# Patient Record
Sex: MALE | Race: WHITE | NOT HISPANIC OR LATINO | Employment: FULL TIME | URBAN - METROPOLITAN AREA
[De-identification: names, ages, dates, MRNs, and addresses within clinical notes are randomized per-mention and may not be internally consistent; named-entity substitution may affect disease eponyms.]

---

## 2018-10-17 ENCOUNTER — APPOINTMENT (OUTPATIENT)
Dept: RADIOLOGY | Facility: CLINIC | Age: 23
End: 2018-10-17
Payer: OTHER MISCELLANEOUS

## 2018-10-17 ENCOUNTER — APPOINTMENT (OUTPATIENT)
Dept: URGENT CARE | Facility: CLINIC | Age: 23
End: 2018-10-17
Payer: OTHER MISCELLANEOUS

## 2018-10-17 DIAGNOSIS — M25.572 LEFT ANKLE PAIN, UNSPECIFIED CHRONICITY: Primary | ICD-10-CM

## 2018-10-17 DIAGNOSIS — M25.572 LEFT ANKLE PAIN, UNSPECIFIED CHRONICITY: ICD-10-CM

## 2018-10-17 PROCEDURE — G0382 LEV 3 HOSP TYPE B ED VISIT: HCPCS | Performed by: PHYSICIAN ASSISTANT

## 2018-10-17 PROCEDURE — 73610 X-RAY EXAM OF ANKLE: CPT

## 2018-10-17 PROCEDURE — 99283 EMERGENCY DEPT VISIT LOW MDM: CPT | Performed by: PHYSICIAN ASSISTANT

## 2018-10-22 ENCOUNTER — APPOINTMENT (OUTPATIENT)
Dept: URGENT CARE | Facility: CLINIC | Age: 23
End: 2018-10-22
Payer: OTHER MISCELLANEOUS

## 2018-10-22 PROCEDURE — 99213 OFFICE O/P EST LOW 20 MIN: CPT

## 2018-10-31 ENCOUNTER — APPOINTMENT (OUTPATIENT)
Dept: URGENT CARE | Facility: CLINIC | Age: 23
End: 2018-10-31
Payer: OTHER MISCELLANEOUS

## 2018-10-31 PROCEDURE — 99213 OFFICE O/P EST LOW 20 MIN: CPT | Performed by: NURSE PRACTITIONER

## 2019-12-17 NOTE — PROGRESS NOTES
CC:   Chief Complaint   Patient presents with   • Establish Care     c/o high BP, w/ migraines, x1 month. Wants flu shot.     Subjective   Jorje Lockwood is a 24 y.o. male presenting to establish care And discuss blood pressure/headaches.    He states that at the end of October he was having a bad migraine.  His roommate who has high blood pressure told him to check with their home BP device.  When he checked, his blood pressure was around 180/102.  He checked multiple times over the following weekAnd it continued to be elevated, though did trend down slightly. His headache started improving and he stopped checking his blood pressure. The migraine itself lasted about a week.  It improved with Excedrin.    He states that at that time his blood pressure was high, he also felt chest heaviness Though not flat out pain and some shortness of breath.  All of these symptoms have resolved since October    No known family history of elevated blood pressure    Drinks alcohol sparingly  Smokes cigar intermittent  Drinks coffee sparingly   Rarely takes NSAIDs    Migraine Headaches for years   Description of pain: global, bilateral in the frontal area.   Duration of individual headaches: 1 day to 1 week , frequency monthly.   Associated symptoms: nausea and stiff neck.  Pain relief/Meds Used: acetaminophen, lying in a darkened room and sleep.   Precipitating factors: patient is aware of none.   He denies a history of recent head injury.   Prior neurological history: negative for migraine headaches.  Neurologic Review of Systems - no TIA or stroke-like symptoms, no amaurosis, diplopia, abnormal speech, unilateral numbness or weakness.    Patient History   Past Medical History:   Diagnosis Date   • Migraines        History reviewed. No pertinent surgical history.  History reviewed. No pertinent family history.  Allergies   Allergen Reactions   • Hydrocodone      Jaw clenches, acting hyper     Social History     Socioeconomic  History   • Marital status: Single     Spouse name: None   • Number of children: None   • Years of education: None   • Highest education level: None   Occupational History   • None   Social Needs   • Financial resource strain: None   • Food insecurity:     Worry: None     Inability: None   • Transportation needs:     Medical: None     Non-medical: None   Tobacco Use   • Smoking status: Former Smoker   • Smokeless tobacco: Never Used   Substance and Sexual Activity   • Alcohol use: Yes     Comment: infrequently    • Drug use: Never   • Sexual activity: Defer   Lifestyle   • Physical activity:     Days per week: None     Minutes per session: None   • Stress: None   Relationships   • Social connections:     Talks on phone: None     Gets together: None     Attends Shinto service: None     Active member of club or organization: None     Attends meetings of clubs or organizations: None     Relationship status: None   • Intimate partner violence:     Fear of current or ex partner: None     Emotionally abused: None     Physically abused: None     Forced sexual activity: None   Other Topics Concern   • None   Social History Narrative   • None     No current outpatient medications on file.       Review of Systems   Review of Systems   Constitutional: Negative.    HENT: Negative.    Eyes: Negative.    Respiratory: Positive for chest tightness and shortness of breath.    Cardiovascular: Positive for chest pain. Negative for palpitations and leg swelling.   Gastrointestinal: Negative.    Endocrine: Negative.    Genitourinary: Negative.    Musculoskeletal: Negative.    Skin: Negative.    Neurological: Positive for headaches.   Hematological: Negative.    Psychiatric/Behavioral: Negative.          Objective   Physical Exam:  Vitals:    12/18/19 0759   BP: 132/90   BP Location: Left upper arm   Patient Position: Sitting   Pulse: 75   Resp: 16   Temp: 36.6 °C (97.9 °F)   TempSrc: Oral   SpO2: 97%   Weight: 87.1 kg (192 lb)  "  Height: 1.778 m (5' 10\")     Physical Exam   Constitutional: He is oriented to person, place, and time. He appears well-developed and well-nourished.   HENT:   Head: Normocephalic and atraumatic.   Eyes: Conjunctivae and EOM are normal.   Neck: Normal range of motion. Neck supple.   Cardiovascular: Normal rate, regular rhythm and normal pulses. Exam reveals no gallop.   No murmur heard.  Pulmonary/Chest: Effort normal and breath sounds normal.   Abdominal: Soft. Bowel sounds are normal.   Musculoskeletal: Normal range of motion. He exhibits no edema.   Neurological: He is alert and oriented to person, place, and time. He has normal strength and normal reflexes. No cranial nerve deficit or sensory deficit.   Skin: Skin is warm and dry.       EKG is normal sinus rhythm at 61 bpm, No signs of heart strain, no ST changes, does not meet criteria for LVH    Assessment/Plan   Jorje Lockwood is a 24 y.o. male presenting for   Chief Complaint   Patient presents with   • Establish Care     c/o high BP, w/ migraines, x1 month. Wants flu shot.      Diagnosis Plan   1. Elevated BP without diagnosis of hypertension  CBC and differential    Comprehensive metabolic panel    TSH w reflex FT4    ECG 12 LEAD OFFICE PERFORMED    CBC and differential    Comprehensive metabolic panel    TSH w reflex FT4   2. Migraine without aura and without status migrainosus, not intractable       Patient with elevated blood pressures back in October.  On repeat here today, his diastolic is elevated to about 88-90. I will have the patient check his blood pressures over the next 4 weeks, at least 10 data points. If his blood pressures do still remain elevated, given he has no family history and is overall healthy, I would recommend evaluation of secondary causes of hypertension.  We will get some blood work today including TSH, CBC, and CMP.  Consider aldosterone/renin if blood pressures running high. Given its his diastolic number that is elevated, I " would consider starting Him on amlodipine on follow-up.      For his migraines, they are fairly infrequent and improved with Excedrin. As we evaluate his blood Pressure, we will see if it is related to his headaches. I recommend a headache diary over the next month.  If his headaches are coming frequently enough To warrant Maintenance medication, but we may consider a triptan in the future. Also unclear if actually migraine headaches versus tension type headaches, headache diary will be useful for this.      Return in about 4 weeks (around 1/15/2020).

## 2019-12-18 ENCOUNTER — OFFICE VISIT (OUTPATIENT)
Dept: FAMILY MEDICINE | Facility: CLINIC | Age: 24
End: 2019-12-18
Payer: COMMERCIAL

## 2019-12-18 VITALS
WEIGHT: 192 LBS | HEIGHT: 70 IN | RESPIRATION RATE: 16 BRPM | TEMPERATURE: 97.9 F | OXYGEN SATURATION: 97 % | HEART RATE: 75 BPM | DIASTOLIC BLOOD PRESSURE: 90 MMHG | SYSTOLIC BLOOD PRESSURE: 132 MMHG | BODY MASS INDEX: 27.49 KG/M2

## 2019-12-18 DIAGNOSIS — G43.009 MIGRAINE WITHOUT AURA AND WITHOUT STATUS MIGRAINOSUS, NOT INTRACTABLE: ICD-10-CM

## 2019-12-18 DIAGNOSIS — R03.0 ELEVATED BP WITHOUT DIAGNOSIS OF HYPERTENSION: Primary | ICD-10-CM

## 2019-12-18 PROBLEM — R51.9 ACUTE NONINTRACTABLE HEADACHE: Status: ACTIVE | Noted: 2019-12-18

## 2019-12-18 PROCEDURE — 99204 OFFICE O/P NEW MOD 45 MIN: CPT | Mod: 25 | Performed by: FAMILY MEDICINE

## 2019-12-18 PROCEDURE — 90471 IMMUNIZATION ADMIN: CPT | Performed by: FAMILY MEDICINE

## 2019-12-18 PROCEDURE — 90686 IIV4 VACC NO PRSV 0.5 ML IM: CPT | Performed by: FAMILY MEDICINE

## 2019-12-18 PROCEDURE — 93000 ELECTROCARDIOGRAM COMPLETE: CPT | Performed by: FAMILY MEDICINE

## 2019-12-18 ASSESSMENT — ENCOUNTER SYMPTOMS
CHEST TIGHTNESS: 1
ENDOCRINE NEGATIVE: 1
PSYCHIATRIC NEGATIVE: 1
CONSTITUTIONAL NEGATIVE: 1
GASTROINTESTINAL NEGATIVE: 1
HEMATOLOGIC/LYMPHATIC NEGATIVE: 1
HEADACHES: 1
SHORTNESS OF BREATH: 1
PALPITATIONS: 0
MUSCULOSKELETAL NEGATIVE: 1
EYES NEGATIVE: 1

## 2019-12-18 ASSESSMENT — PAIN SCALES - GENERAL: PAINLEVEL: 0-NO PAIN

## 2019-12-19 PROBLEM — R74.01 TRANSAMINITIS: Status: ACTIVE | Noted: 2019-12-19

## 2019-12-19 LAB
ALBUMIN SERPL-MCNC: 4.5 G/DL (ref 3.6–5.1)
ALBUMIN/GLOB SERPL: 1.9 (CALC) (ref 1–2.5)
ALP SERPL-CCNC: 91 U/L (ref 40–115)
ALT SERPL-CCNC: 110 U/L (ref 9–46)
AST SERPL-CCNC: 42 U/L (ref 10–40)
BASOPHILS # BLD AUTO: 27 CELLS/UL (ref 0–200)
BASOPHILS NFR BLD AUTO: 0.4 %
BILIRUB SERPL-MCNC: 0.7 MG/DL (ref 0.2–1.2)
BUN SERPL-MCNC: 13 MG/DL (ref 7–25)
BUN/CREAT SERPL: ABNORMAL (CALC) (ref 6–22)
CALCIUM SERPL-MCNC: 9.6 MG/DL (ref 8.6–10.3)
CHLORIDE SERPL-SCNC: 105 MMOL/L (ref 98–110)
CO2 SERPL-SCNC: 29 MMOL/L (ref 20–32)
CREAT SERPL-MCNC: 1.06 MG/DL (ref 0.6–1.35)
EOSINOPHIL # BLD AUTO: 121 CELLS/UL (ref 15–500)
EOSINOPHIL NFR BLD AUTO: 1.8 %
ERYTHROCYTE [DISTWIDTH] IN BLOOD BY AUTOMATED COUNT: 12.5 % (ref 11–15)
GLOBULIN SER CALC-MCNC: 2.4 G/DL (CALC) (ref 1.9–3.7)
GLUCOSE SERPL-MCNC: 94 MG/DL (ref 65–99)
HCT VFR BLD AUTO: 46 % (ref 38.5–50)
HGB BLD-MCNC: 15.7 G/DL (ref 13.2–17.1)
LYMPHOCYTES # BLD AUTO: 1916 CELLS/UL (ref 850–3900)
LYMPHOCYTES NFR BLD AUTO: 28.6 %
MCH RBC QN AUTO: 29.3 PG (ref 27–33)
MCHC RBC AUTO-ENTMCNC: 34.1 G/DL (ref 32–36)
MCV RBC AUTO: 85.8 FL (ref 80–100)
MONOCYTES # BLD AUTO: 630 CELLS/UL (ref 200–950)
MONOCYTES NFR BLD AUTO: 9.4 %
NEUTROPHILS # BLD AUTO: 4007 CELLS/UL (ref 1500–7800)
NEUTROPHILS NFR BLD AUTO: 59.8 %
PLATELET # BLD AUTO: 262 THOUSAND/UL (ref 140–400)
PMV BLD REES-ECKER: 12.8 FL (ref 7.5–12.5)
POTASSIUM SERPL-SCNC: 4.6 MMOL/L (ref 3.5–5.3)
PROT SERPL-MCNC: 6.9 G/DL (ref 6.1–8.1)
QUEST EGFR NON-AFR. AMERICAN: 96 ML/MIN/1.73M2
RBC # BLD AUTO: 5.36 MILLION/UL (ref 4.2–5.8)
SODIUM SERPL-SCNC: 141 MMOL/L (ref 135–146)
TSH SERPL-ACNC: 2.49 MIU/L (ref 0.4–4.5)
WBC # BLD AUTO: 6.7 THOUSAND/UL (ref 3.8–10.8)

## 2020-01-17 ENCOUNTER — OFFICE VISIT (OUTPATIENT)
Dept: FAMILY MEDICINE | Facility: CLINIC | Age: 25
End: 2020-01-17
Payer: COMMERCIAL

## 2020-01-17 VITALS
HEART RATE: 82 BPM | BODY MASS INDEX: 27.06 KG/M2 | HEIGHT: 70 IN | TEMPERATURE: 97.8 F | WEIGHT: 189 LBS | OXYGEN SATURATION: 98 % | SYSTOLIC BLOOD PRESSURE: 130 MMHG | DIASTOLIC BLOOD PRESSURE: 90 MMHG | RESPIRATION RATE: 16 BRPM

## 2020-01-17 DIAGNOSIS — R74.01 TRANSAMINITIS: ICD-10-CM

## 2020-01-17 DIAGNOSIS — Z00.00 ENCOUNTER FOR GENERAL ADULT MEDICAL EXAMINATION WITHOUT ABNORMAL FINDINGS: Primary | ICD-10-CM

## 2020-01-17 DIAGNOSIS — I10 ESSENTIAL HYPERTENSION: ICD-10-CM

## 2020-01-17 DIAGNOSIS — G43.009 MIGRAINE WITHOUT AURA AND WITHOUT STATUS MIGRAINOSUS, NOT INTRACTABLE: ICD-10-CM

## 2020-01-17 PROCEDURE — 99395 PREV VISIT EST AGE 18-39: CPT | Performed by: FAMILY MEDICINE

## 2020-01-17 RX ORDER — AMLODIPINE BESYLATE 5 MG/1
5 TABLET ORAL DAILY
Qty: 90 TABLET | Refills: 0 | Status: SHIPPED | OUTPATIENT
Start: 2020-01-17 | End: 2020-04-16

## 2020-01-17 ASSESSMENT — ENCOUNTER SYMPTOMS
RESPIRATORY NEGATIVE: 1
HEADACHES: 1
ENDOCRINE NEGATIVE: 1
GASTROINTESTINAL NEGATIVE: 1
CARDIOVASCULAR NEGATIVE: 1
CONSTITUTIONAL NEGATIVE: 1
PSYCHIATRIC NEGATIVE: 1
HEMATOLOGIC/LYMPHATIC NEGATIVE: 1
MUSCULOSKELETAL NEGATIVE: 1
EYES NEGATIVE: 1

## 2020-01-17 ASSESSMENT — PAIN SCALES - GENERAL: PAINLEVEL: 0-NO PAIN

## 2020-01-17 NOTE — PROGRESS NOTES
CC:   Chief Complaint   Patient presents with   • Annual Exam     Subjective   Jorje Lockwood is a 24 y.o. male presenting for physical  ?  No acute complaints    #HTN: BPs have been in the 130-140s/90s at home    #Migraine Headaches : Only 1 in the last month, He is realizing more more that he thinks it was related to stress from school    Diet: Too salty, too much fast food  Exercise: intermittent   Dental: needs to see dentist  Eye exam: yearly    Work: Student at [employer], computer tech  Relationship: Single, sexually active, women     Patient History   Past Medical History:   Diagnosis Date   • Migraines        History reviewed. No pertinent surgical history.  History reviewed. No pertinent family history.  Allergies   Allergen Reactions   • Hydrocodone      Jaw clenches, acting hyper     Social History     Socioeconomic History   • Marital status: Single     Spouse name: None   • Number of children: None   • Years of education: None   • Highest education level: None   Occupational History   • None   Social Needs   • Financial resource strain: None   • Food insecurity:     Worry: None     Inability: None   • Transportation needs:     Medical: None     Non-medical: None   Tobacco Use   • Smoking status: Former Smoker   • Smokeless tobacco: Never Used   Substance and Sexual Activity   • Alcohol use: Yes     Comment: infrequently    • Drug use: Never   • Sexual activity: Defer   Lifestyle   • Physical activity:     Days per week: None     Minutes per session: None   • Stress: None   Relationships   • Social connections:     Talks on phone: None     Gets together: None     Attends Zoroastrianism service: None     Active member of club or organization: None     Attends meetings of clubs or organizations: None     Relationship status: None   • Intimate partner violence:     Fear of current or ex partner: None     Emotionally abused: None     Physically abused: None     Forced sexual activity: None   Other  "Topics Concern   • None   Social History Narrative   • None       Current Outpatient Medications:   •  amLODIPine (NORVASC) 5 mg tablet, Take 1 tablet (5 mg total) by mouth daily., Disp: 90 tablet, Rfl: 0       Review of Systems   Review of Systems   Constitutional: Negative.    HENT: Negative.    Eyes: Negative.    Respiratory: Negative.    Cardiovascular: Negative.    Gastrointestinal: Negative.    Endocrine: Negative.    Genitourinary: Negative.    Musculoskeletal: Negative.    Skin: Negative.    Neurological: Positive for headaches.   Hematological: Negative.    Psychiatric/Behavioral: Negative.        Objective   Physical Exam:  Vitals:    01/17/20 0851   BP: 130/90   BP Location: Left upper arm   Patient Position: Sitting   Pulse: 82   Resp: 16   Temp: 36.6 °C (97.8 °F)   TempSrc: Oral   SpO2: 98%   Weight: 85.7 kg (189 lb)   Height: 1.778 m (5' 10\")     Physical Exam   Constitutional: He is oriented to person, place, and time. He appears well-developed and well-nourished.   HENT:   Head: Normocephalic and atraumatic.   Eyes: Conjunctivae and EOM are normal.   Neck: Normal range of motion. Neck supple.   Cardiovascular: Normal rate, regular rhythm and normal heart sounds.   Pulmonary/Chest: Effort normal and breath sounds normal.   Abdominal: Soft. Bowel sounds are normal.   Musculoskeletal: Normal range of motion. He exhibits no edema.   Neurological: He is alert and oriented to person, place, and time.   Skin: Skin is warm and dry.   ?  ?  Assessment/Plan   Jorje Lockwood is a 24 y.o. male presenting for   Chief Complaint   Patient presents with   • Annual Exam      Diagnosis Plan   1. Encounter for general adult medical examination without abnormal findings     2. Essential hypertension  amLODIPine (NORVASC) 5 mg tablet    Given his blood pressures are consistently over diastolic 90, will start on amlodipine 5 mg daily.  Patient to check blood pressures.  Return in 4-8 weeks   3. Migraine without aura and " without status migrainosus, not intractable      Have improved in frequency, patient hydrating, continue episodic treatment   4. Transaminitis  Comprehensive metabolic panel    Comprehensive metabolic panel    AST and ALT elevated on last check, ALT more than AST.  Recheck CMP in 2 weeks-- Consider hepatitis panel and right upper quadrant ultrasound if high     #Health Maintenance: Up-to-date  - Immunizations: Patient thinks that he got his Tdap at 16, working on outpatient record    Return in about 2 months (around 3/17/2020).

## 2020-01-17 NOTE — PATIENT INSTRUCTIONS
Patient Education     Blood Pressure Record Sheet  To take your blood pressure, you will need a blood pressure machine. You can buy a blood pressure machine (blood pressure monitor) at your clinic, drug store, or online. When choosing one, consider:  · An automatic monitor that has an arm cuff.  · A cuff that wraps snugly around your upper arm. You should be able to fit only one finger between your arm and the cuff.  · A device that stores blood pressure reading results.  · Do not choose a monitor that measures your blood pressure from your wrist or finger.  Follow your health care provider's instructions for how to take your blood pressure. To use this form:  · Get one reading in the morning (a.m.) before you take any medicines.  · Get one reading in the evening (p.m.) before supper.  · Take at least 2 readings with each blood pressure check. This makes sure the results are correct. Wait 1-2 minutes between measurements.  · Write down the results in the spaces on this form.  · Repeat this once a week, or as told by your health care provider.  · Make a follow-up appointment with your health care provider to discuss the results.  Blood pressure log  Date: _______________________  · a.m. _____________________(1st reading) _____________________(2nd reading)  · p.m. _____________________(1st reading) _____________________(2nd reading)  Date: _______________________  · a.m. _____________________(1st reading) _____________________(2nd reading)  · p.m. _____________________(1st reading) _____________________(2nd reading)  Date: _______________________  · a.m. _____________________(1st reading) _____________________(2nd reading)  · p.m. _____________________(1st reading) _____________________(2nd reading)  Date: _______________________  · a.m. _____________________(1st reading) _____________________(2nd reading)  · p.m. _____________________(1st reading) _____________________(2nd reading)  Date:  "_______________________  · a.m. _____________________(1st reading) _____________________(2nd reading)  · p.m. _____________________(1st reading) _____________________(2nd reading)  This information is not intended to replace advice given to you by your health care provider. Make sure you discuss any questions you have with your health care provider.  Document Released: 09/15/2004 Document Revised: 12/18/2018 Document Reviewed: 12/18/2018  [employer] Interactive Patient Education © 2019 [employer]     Patient Education     DASH Eating Plan  DASH stands for \"Dietary Approaches to Stop Hypertension.\" The DASH eating plan is a healthy eating plan that has been shown to reduce high blood pressure (hypertension). It may also reduce your risk for type 2 diabetes, heart disease, and stroke. The DASH eating plan may also help with weight loss.  What are tips for following this plan?    General guidelines  · Avoid eating more than 2,300 mg (milligrams) of salt (sodium) a day. If you have hypertension, you may need to reduce your sodium intake to 1,500 mg a day.  · Limit alcohol intake to no more than 1 drink a day for nonpregnant women and 2 drinks a day for men. One drink equals 12 oz of beer, 5 oz of wine, or 1½ oz of hard liquor.  · Work with your health care provider to maintain a healthy body weight or to lose weight. Ask what an ideal weight is for you.  · Get at least 30 minutes of exercise that causes your heart to beat faster (aerobic exercise) most days of the week. Activities may include walking, swimming, or biking.  · Work with your health care provider or diet and nutrition specialist (dietitian) to adjust your eating plan to your individual calorie needs.  Reading food labels    · Check food labels for the amount of sodium per serving. Choose foods with less than 5 percent of the Daily Value of sodium. Generally, foods with less than 300 mg of sodium per serving fit into this eating plan.  · To find whole " "grains, look for the word \"whole\" as the first word in the ingredient list.  Shopping  · Buy products labeled as \"low-sodium\" or \"no salt added.\"  · Buy fresh foods. Avoid canned foods and premade or frozen meals.  Cooking  · Avoid adding salt when cooking. Use salt-free seasonings or herbs instead of table salt or sea salt. Check with your health care provider or pharmacist before using salt substitutes.  · Do not low foods. Cook foods using healthy methods such as baking, boiling, grilling, and broiling instead.  · Cook with heart-healthy oils, such as olive, canola, soybean, or sunflower oil.  Meal planning  · Eat a balanced diet that includes:  ? 5 or more servings of fruits and vegetables each day. At each meal, try to fill half of your plate with fruits and vegetables.  ? Up to 6-8 servings of whole grains each day.  ? Less than 6 oz of lean meat, poultry, or fish each day. A 3-oz serving of meat is about the same size as a deck of cards. One egg equals 1 oz.  ? 2 servings of low-fat dairy each day.  ? A serving of nuts, seeds, or beans 5 times each week.  ? Heart-healthy fats. Healthy fats called Omega-3 fatty acids are found in foods such as flaxseeds and coldwater fish, like sardines, salmon, and mackerel.  · Limit how much you eat of the following:  ? Canned or prepackaged foods.  ? Food that is high in trans fat, such as fried foods.  ? Food that is high in saturated fat, such as fatty meat.  ? Sweets, desserts, sugary drinks, and other foods with added sugar.  ? Full-fat dairy products.  · Do not salt foods before eating.  · Try to eat at least 2 vegetarian meals each week.  · Eat more home-cooked food and less restaurant, buffet, and fast food.  · When eating at a restaurant, ask that your food be prepared with less salt or no salt, if possible.  What foods are recommended?  The items listed may not be a complete list. Talk with your dietitian about what dietary choices are best for " you.  Grains  Whole-grain or whole-wheat bread. Whole-grain or whole-wheat pasta. Brown rice. Oatmeal. Quinoa. Bulgur. Whole-grain and low-sodium cereals. Janki bread. Low-fat, low-sodium crackers. Whole-wheat flour tortillas.  Vegetables  Fresh or frozen vegetables (raw, steamed, roasted, or grilled). Low-sodium or reduced-sodium tomato and vegetable juice. Low-sodium or reduced-sodium tomato sauce and tomato paste. Low-sodium or reduced-sodium canned vegetables.  Fruits  All fresh, dried, or frozen fruit. Canned fruit in natural juice (without added sugar).  Meat and other protein foods  Skinless chicken or turkey. Ground chicken or turkey. Pork with fat trimmed off. Fish and seafood. Egg whites. Dried beans, peas, or lentils. Unsalted nuts, nut butters, and seeds. Unsalted canned beans. Lean cuts of beef with fat trimmed off. Low-sodium, lean deli meat.  Dairy  Low-fat (1%) or fat-free (skim) milk. Fat-free, low-fat, or reduced-fat cheeses. Nonfat, low-sodium ricotta or cottage cheese. Low-fat or nonfat yogurt. Low-fat, low-sodium cheese.  Fats and oils  Soft margarine without trans fats. Vegetable oil. Low-fat, reduced-fat, or light mayonnaise and salad dressings (reduced-sodium). Canola, safflower, olive, soybean, and sunflower oils. Avocado.  Seasoning and other foods  Herbs. Spices. Seasoning mixes without salt. Unsalted popcorn and pretzels. Fat-free sweets.  What foods are not recommended?  The items listed may not be a complete list. Talk with your dietitian about what dietary choices are best for you.  Grains  Baked goods made with fat, such as croissants, muffins, or some breads. Dry pasta or rice meal packs.  Vegetables  Creamed or fried vegetables. Vegetables in a cheese sauce. Regular canned vegetables (not low-sodium or reduced-sodium). Regular canned tomato sauce and paste (not low-sodium or reduced-sodium). Regular tomato and vegetable juice (not low-sodium or reduced-sodium). Zeinab.  Olives.  Fruits  Canned fruit in a light or heavy syrup. Fried fruit. Fruit in cream or butter sauce.  Meat and other protein foods  Fatty cuts of meat. Ribs. Fried meat. Beatty. Sausage. Bologna and other processed lunch meats. Salami. Fatback. Hotdogs. Bratwurst. Salted nuts and seeds. Canned beans with added salt. Canned or smoked fish. Whole eggs or egg yolks. Chicken or turkey with skin.  Dairy  Whole or 2% milk, cream, and half-and-half. Whole or full-fat cream cheese. Whole-fat or sweetened yogurt. Full-fat cheese. Nondairy creamers. Whipped toppings. Processed cheese and cheese spreads.  Fats and oils  Butter. Stick margarine. Lard. Shortening. Ghee. Beatty fat. Tropical oils, such as coconut, palm kernel, or palm oil.  Seasoning and other foods  Salted popcorn and pretzels. Onion salt, garlic salt, seasoned salt, table salt, and sea salt. Worcestershire sauce. Tartar sauce. Barbecue sauce. Teriyaki sauce. Soy sauce, including reduced-sodium. Steak sauce. Canned and packaged gravies. Fish sauce. Oyster sauce. Cocktail sauce. Horseradish that you find on the shelf. Ketchup. Mustard. Meat flavorings and tenderizers. Bouillon cubes. Hot sauce and Tabasco sauce. Premade or packaged marinades. Premade or packaged taco seasonings. Relishes. Regular salad dressings.  Where to find more information:  · National Heart, Lung, and Blood [place]: www.nhlbi.nih.gov  · American Heart Association: www.heart.org  Summary  · The DASH eating plan is a healthy eating plan that has been shown to reduce high blood pressure (hypertension). It may also reduce your risk for type 2 diabetes, heart disease, and stroke.  · With the DASH eating plan, you should limit salt (sodium) intake to 2,300 mg a day. If you have hypertension, you may need to reduce your sodium intake to 1,500 mg a day.  · When on the DASH eating plan, aim to eat more fresh fruits and vegetables, whole grains, lean proteins, low-fat dairy, and heart-healthy  fats.  · Work with your health care provider or diet and nutrition specialist (dietitian) to adjust your eating plan to your individual calorie needs.  This information is not intended to replace advice given to you by your health care provider. Make sure you discuss any questions you have with your health care provider.  Document Released: 12/06/2012 Document Revised: 12/11/2017 Document Reviewed: 12/11/2017  [employer] Interactive Patient Education © 2019 [employer]

## 2020-03-17 ENCOUNTER — TELEPHONE (OUTPATIENT)
Dept: FAMILY MEDICINE | Facility: CLINIC | Age: 25
End: 2020-03-17

## 2020-03-17 NOTE — TELEPHONE ENCOUNTER
SW patient, ok to cancel appt Friday. Does not want Telemedicine phone call. I will call in about a month to r/s for BP check. States he feels BP is under control at this time, w/ no side effects/symptoms.

## 2020-04-01 ENCOUNTER — TELEMEDICINE (OUTPATIENT)
Dept: FAMILY MEDICINE | Facility: CLINIC | Age: 25
End: 2020-04-01
Payer: COMMERCIAL

## 2020-04-01 ENCOUNTER — CLINICAL SUPPORT (OUTPATIENT)
Dept: OTHER | Facility: CLINIC | Age: 25
End: 2020-04-01
Attending: FAMILY MEDICINE

## 2020-04-01 ENCOUNTER — TELEPHONE (OUTPATIENT)
Dept: FAMILY MEDICINE | Facility: CLINIC | Age: 25
End: 2020-04-01

## 2020-04-01 DIAGNOSIS — R05.9 COUGH: ICD-10-CM

## 2020-04-01 DIAGNOSIS — R06.02 SHORTNESS OF BREATH: ICD-10-CM

## 2020-04-01 DIAGNOSIS — R06.02 SHORTNESS OF BREATH: Primary | ICD-10-CM

## 2020-04-01 PROCEDURE — 99213 OFFICE O/P EST LOW 20 MIN: CPT | Mod: 95 | Performed by: FAMILY MEDICINE

## 2020-04-01 RX ORDER — ALBUTEROL SULFATE 90 UG/1
2 INHALANT RESPIRATORY (INHALATION) EVERY 6 HOURS PRN
Qty: 1 INHALER | Refills: 1 | Status: SHIPPED | OUTPATIENT
Start: 2020-04-01 | End: 2021-01-20

## 2020-04-01 ASSESSMENT — ENCOUNTER SYMPTOMS
APPETITE CHANGE: 1
ENDOCRINE NEGATIVE: 1
EYES NEGATIVE: 1
SINUS PAIN: 0
SINUS PRESSURE: 0
RHINORRHEA: 0
GASTROINTESTINAL NEGATIVE: 1
FATIGUE: 1
NEUROLOGICAL NEGATIVE: 1
SHORTNESS OF BREATH: 1
CARDIOVASCULAR NEGATIVE: 1
HEMATOLOGIC/LYMPHATIC NEGATIVE: 1
PSYCHIATRIC NEGATIVE: 1
CHEST TIGHTNESS: 1
COUGH: 1
MUSCULOSKELETAL NEGATIVE: 1
FEVER: 0

## 2020-04-01 NOTE — TELEPHONE ENCOUNTER
Patient called with concern for covid19: cough, SOB (cannot take deep breath without coughing), lack of appetite, exhaustion, no fevers. 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 Has not been isolating himself, still working. Please triage at 2pm telemed. Thanks

## 2020-04-01 NOTE — PROGRESS NOTES
Request for Consent:   Audio Only Encounter   You and I are about to have a telemedicine check-in or visit. This is allowed because you have requested it. This telemedicine visit will be billed to your health insurance or you, if you are self-insured. You understand you will be responsible for any copayments or coinsurances that apply to your telemedicine visit. Before starting our telemedicine visit, I am required to get your consent for this virtual check-in or visit by telemedicine. Do you consent?    Patient Response to Request for Consent:  Yes    Timmy Lockwood is a 25 y.o. 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  Due to recent exposure and symptoms patient a possible COVID-19 Infection.  Signs and symptoms discussed with patient.  Patient educated to self isolate in a room in his home away from others they live with. Mask if available. Patient advised not to leave house for any reason.     Self treatment discussed including tylenol for fever, pain or myalgia; cough cold medications for symptoms.  Patient to check temperature daily.  Monitor for symptoms of respiratory distress.      Nature of disease to cause severe respiratory distress day 8 or 9 discussed.  If needs emergent care to notify EMS or ED or our office that he may have COVID to allow for proper PPE and isolation.     The following medications were prescribed:  Albuterol for respiratory wheezing, SOB, chest tightness      Review of Systems   Constitutional: Positive for appetite change and fatigue. Negative for fever.   HENT: Negative for congestion,  postnasal drip, rhinorrhea, sinus pressure and sinus pain.    Eyes: Negative.    Respiratory: Positive for cough, chest tightness and shortness of breath.    Cardiovascular: Negative.    Gastrointestinal: Negative.    Endocrine: Negative.    Genitourinary: Negative.    Musculoskeletal: Negative.    Skin: Negative.    Neurological: Negative.    Hematological: Negative.    Psychiatric/Behavioral: Negative.          The following have been reviewed and updated as appropriate in this visit:  Tobacco  Allergies  Meds  Problems  Med Hx  Surg Hx  Fam Hx  Soc Hx        ?  Objective   No acute distress over the phone, able to state complete sentences      Assessment/Plan   Jorje Lockwood is a 25 y.o. male being called via telemedicine.   Diagnosis Plan   1. Shortness of breath  albuterol HFA (VENTOLIN HFA) 90 mcg/actuation inhaler    COVID-19 ([facility])   2. Cough  albuterol HFA (VENTOLIN HFA) 90 mcg/actuation inhaler    COVID-19 ([facility])         Time Spent in Medical Discussion During This Encounter:  15 minutes

## 2020-04-01 NOTE — PROGRESS NOTES
Patient was processed today at [facility] drive-up clinic.  Pt denies any sort of medical distress today.  After identifying the patient, a nasopharyngeal sample was obtained and placed in viral transport medium.  Pt was given a post-collection education sheet and encouraged to self-isolate until they receive the results.  Pt directed to [facility] website for [facility] Privacy Practices.  Sample sent to the lab noted on the order and requisition.  Pt was without additional questions or concerns and was dispositioned from the testing area to home.

## 2020-04-06 LAB
QUEST OVERALL RESULT:: NOT DETECTED
SARS-COV RNA SPEC QL NAA+PROBE: NEGATIVE
SARS-COV-2 RNA RESP QL NAA+PROBE: NEGATIVE
SPECIMEN SOURCE: NORMAL
SYMPTOM: YES

## 2020-04-16 DIAGNOSIS — I10 ESSENTIAL HYPERTENSION: ICD-10-CM

## 2020-04-16 RX ORDER — AMLODIPINE BESYLATE 5 MG/1
TABLET ORAL
Qty: 90 TABLET | Refills: 0 | Status: SHIPPED | OUTPATIENT
Start: 2020-04-16 | End: 2020-07-13

## 2020-07-12 DIAGNOSIS — I10 ESSENTIAL HYPERTENSION: ICD-10-CM

## 2020-07-13 RX ORDER — AMLODIPINE BESYLATE 5 MG/1
TABLET ORAL
Qty: 90 TABLET | Refills: 0 | Status: SHIPPED | OUTPATIENT
Start: 2020-07-13 | End: 2021-01-20

## 2021-01-19 NOTE — PROGRESS NOTES
CC:   Chief Complaint   Patient presents with   • Annual Exam     Stressed planning his wedding during in Covid. Unsure if he had flu shot this year. Wants Derm referral.     Subjective   Jorje Lockwood is a 25 y.o. male presenting for physical   ?  No acute complaints    #HTN: BPs have been in the 130-140s/90s at home  Had been taking amlodipine  Denies any chest pain, shortness of breath, leg swelling  Patient does not check blood pressures at home  Blood pressures @home:     #Migraine Headaches: have improved, not having as frequently      Diet: better  Exercise: intermittent   Dental: needs to see dentist  Eye exam: yearly     Work: quit school, - loves job  Relationship: [place]21 [place] [place]    Patient History   Past Medical History:   Diagnosis Date   • Migraines        History reviewed. No pertinent surgical history.  Family History   Problem Relation Age of Onset   • No Known Problems Biological Mother    • No Known Problems Biological Father    • No Known Problems Biological Sister      Allergies   Allergen Reactions   • Hydrocodone      Jaw clenches, acting hyper     Social History     Socioeconomic History   • Marital status: Single     Spouse name: None   • Number of children: None   • Years of education: None   • Highest education level: None   Occupational History   • None   Social Needs   • Financial resource strain: None   • Food insecurity     Worry: None     Inability: None   • Transportation needs     Medical: None     Non-medical: None   Tobacco Use   • Smoking status: Smoker, Current Status Unknown   • Smokeless tobacco: Never Used   • Tobacco comment: Cigars only, summer time   Substance and Sexual Activity   • Alcohol use: Yes     Frequency: Monthly or less     Drinks per session: 1 or 2     Binge frequency: Never     Comment: infrequently    • Drug use: Never   • Sexual activity: Defer   Lifestyle   • Physical activity     Days per week: None      "Minutes per session: None   • Stress: None   Relationships   • Social connections     Talks on phone: None     Gets together: None     Attends Judaism service: None     Active member of club or organization: None     Attends meetings of clubs or organizations: None     Relationship status: None   • Intimate partner violence     Fear of current or ex partner: None     Emotionally abused: None     Physically abused: None     Forced sexual activity: None   Other Topics Concern   • None   Social History Narrative   • None     No current outpatient medications on file.       Review of Systems   Review of Systems   Constitutional: Negative.    HENT: Negative.    Eyes: Negative.    Respiratory: Negative.    Cardiovascular: Negative.    Gastrointestinal: Negative.    Endocrine: Negative.    Genitourinary: Negative.    Musculoskeletal: Negative.    Skin: Negative.    Neurological: Negative.    Hematological: Negative.    Psychiatric/Behavioral: The patient is nervous/anxious.          Objective   Physical Exam:  Vitals:    01/20/21 1050 01/20/21 1105   BP: 140/80 128/78   BP Location: Right upper arm    Patient Position: Sitting    Pulse: 77    Resp: 16    Temp: 36.7 °C (98 °F)    TempSrc: Temporal    SpO2: 97%    Weight: 93.9 kg (207 lb)    Height: 1.778 m (5' 10\")      Physical Exam  Constitutional:       Appearance: He is well-developed.   HENT:      Head: Normocephalic and atraumatic.   Eyes:      Conjunctiva/sclera: Conjunctivae normal.   Neck:      Musculoskeletal: Normal range of motion and neck supple.   Cardiovascular:      Rate and Rhythm: Normal rate and regular rhythm.      Heart sounds: Normal heart sounds.   Pulmonary:      Effort: Pulmonary effort is normal.      Breath sounds: Normal breath sounds.   Abdominal:      General: Bowel sounds are normal.      Palpations: Abdomen is soft.   Musculoskeletal: Normal range of motion.   Skin:     General: Skin is warm and dry.   Neurological:      Mental Status: He " is alert and oriented to person, place, and time.   Psychiatric:         Mood and Affect: Mood normal.         Behavior: Behavior normal.         Thought Content: Thought content normal.         Judgment: Judgment normal.         ?  ?  Assessment/Plan   Jorje Lockwood is a 25 y.o. male presenting for   Chief Complaint   Patient presents with   • Annual Exam     Stressed planning his wedding during in Covid. Unsure if he had flu shot this year. Wants Derm referral.      Diagnosis Plan   1. Encounter for general adult medical examination without abnormal findings  CBC    Comprehensive metabolic panel    CBC    Comprehensive metabolic panel    Overall doing well, patient getting , Discussed issues as below   2. Elevated BP without diagnosis of hypertension      Has not been taking the amlodipine, BP is look better, okay to Stay off medication but recommend intermittent BP check, back on amlodipine if high   3. Migraine without aura and without status migrainosus, not intractable      No recent headaches, have been well controlled   4. Transaminitis  Comprehensive metabolic panel    Comprehensive metabolic panel    Have trended down on last blood work, recheck   5. Screening cholesterol level  Lipid panel    Lipid panel    Check baseline cholesterol       #Health Maintenance: UTD  - Immunizations: UTD    Return in about 1 year (around 1/20/2022).

## 2021-01-20 ENCOUNTER — OFFICE VISIT (OUTPATIENT)
Dept: FAMILY MEDICINE | Facility: CLINIC | Age: 26
End: 2021-01-20
Payer: COMMERCIAL

## 2021-01-20 VITALS
DIASTOLIC BLOOD PRESSURE: 78 MMHG | BODY MASS INDEX: 29.63 KG/M2 | WEIGHT: 207 LBS | SYSTOLIC BLOOD PRESSURE: 128 MMHG | HEART RATE: 77 BPM | TEMPERATURE: 98 F | HEIGHT: 70 IN | RESPIRATION RATE: 16 BRPM | OXYGEN SATURATION: 97 %

## 2021-01-20 DIAGNOSIS — R74.01 TRANSAMINITIS: ICD-10-CM

## 2021-01-20 DIAGNOSIS — Z13.220 SCREENING CHOLESTEROL LEVEL: ICD-10-CM

## 2021-01-20 DIAGNOSIS — R03.0 ELEVATED BP WITHOUT DIAGNOSIS OF HYPERTENSION: ICD-10-CM

## 2021-01-20 DIAGNOSIS — G43.009 MIGRAINE WITHOUT AURA AND WITHOUT STATUS MIGRAINOSUS, NOT INTRACTABLE: ICD-10-CM

## 2021-01-20 DIAGNOSIS — Z00.00 ENCOUNTER FOR GENERAL ADULT MEDICAL EXAMINATION WITHOUT ABNORMAL FINDINGS: Primary | ICD-10-CM

## 2021-01-20 PROCEDURE — 99395 PREV VISIT EST AGE 18-39: CPT | Performed by: FAMILY MEDICINE

## 2021-01-20 SDOH — HEALTH STABILITY: MENTAL HEALTH: HOW OFTEN DO YOU HAVE A DRINK CONTAINING ALCOHOL?: MONTHLY OR LESS

## 2021-01-20 SDOH — HEALTH STABILITY: MENTAL HEALTH: HOW OFTEN DO YOU HAVE SIX OR MORE DRINKS ON ONE OCCASION?: NEVER

## 2021-01-20 SDOH — HEALTH STABILITY: MENTAL HEALTH: HOW MANY DRINKS CONTAINING ALCOHOL DO YOU HAVE ON A TYPICAL DAY WHEN YOU ARE DRINKING?: 1 OR 2

## 2021-01-20 ASSESSMENT — ENCOUNTER SYMPTOMS
RESPIRATORY NEGATIVE: 1
NEUROLOGICAL NEGATIVE: 1
GASTROINTESTINAL NEGATIVE: 1
ENDOCRINE NEGATIVE: 1
MUSCULOSKELETAL NEGATIVE: 1
CARDIOVASCULAR NEGATIVE: 1
NERVOUS/ANXIOUS: 1
CONSTITUTIONAL NEGATIVE: 1
EYES NEGATIVE: 1
HEMATOLOGIC/LYMPHATIC NEGATIVE: 1

## 2021-01-20 ASSESSMENT — PATIENT HEALTH QUESTIONNAIRE - PHQ9: SUM OF ALL RESPONSES TO PHQ9 QUESTIONS 1 & 2: 0

## 2021-01-20 NOTE — PATIENT INSTRUCTIONS
Patient Education     Blood Pressure Record Sheet  To take your blood pressure, you will need a blood pressure machine. You can buy a blood pressure machine (blood pressure monitor) at your clinic, drug store, or online. When choosing one, consider:  · An automatic monitor that has an arm cuff.  · A cuff that wraps snugly around your upper arm. You should be able to fit only one finger between your arm and the cuff.  · A device that stores blood pressure reading results.  · Do not choose a monitor that measures your blood pressure from your wrist or finger.  Follow your health care provider's instructions for how to take your blood pressure. To use this form:  · Get one reading in the morning (a.m.) before you take any medicines.  · Get one reading in the evening (p.m.) before supper.  · Take at least 2 readings with each blood pressure check. This makes sure the results are correct. Wait 1-2 minutes between measurements.  · Write down the results in the spaces on this form.  · Repeat this once a week, or as told by your health care provider.  · Make a follow-up appointment with your health care provider to discuss the results.  Blood pressure log  Date: _______________________  · a.m. _____________________(1st reading) _____________________(2nd reading)  · p.m. _____________________(1st reading) _____________________(2nd reading)  Date: _______________________  · a.m. _____________________(1st reading) _____________________(2nd reading)  · p.m. _____________________(1st reading) _____________________(2nd reading)  Date: _______________________  · a.m. _____________________(1st reading) _____________________(2nd reading)  · p.m. _____________________(1st reading) _____________________(2nd reading)  Date: _______________________  · a.m. _____________________(1st reading) _____________________(2nd reading)  · p.m. _____________________(1st reading) _____________________(2nd reading)  Date:  _______________________  · a.m. _____________________(1st reading) _____________________(2nd reading)  · p.m. _____________________(1st reading) _____________________(2nd reading)  This information is not intended to replace advice given to you by your health care provider. Make sure you discuss any questions you have with your health care provider.  Document Released: 09/15/2004 Document Revised: 02/15/2019 Document Reviewed: 12/18/2018  Elsevier Patient Education © 2020 [employer]     Patient Education     How to Take Your Blood Pressure  You can take your blood pressure at home with a machine. You may need to check your blood pressure at home:  · To check if you have high blood pressure (hypertension).  · To check your blood pressure over time.  · To make sure your blood pressure medicine is working.  Supplies needed:  You will need a blood pressure machine, or monitor. You can buy one at a [employer] or online. When choosing one:  · Choose one with an arm cuff.  · Choose one that wraps around your upper arm. Only one finger should fit between your arm and the cuff.  · Do not choose one that measures your blood pressure from your wrist or finger.  Your doctor can suggest a monitor.  How to prepare  Avoid these things for 30 minutes before checking your blood pressure:  · Drinking caffeine.  · Drinking alcohol.  · Eating.  · Smoking.  · Exercising.  Five minutes before checking your blood pressure:  · Pee.  · Sit in a dining chair. Avoid sitting in a soft couch or armchair.  · Be quiet. Do not talk.  How to take your blood pressure  Follow the instructions that came with your machine. If you have a digital blood pressure monitor, these may be the instructions:  1. Sit up straight.  2. Place your feet on the floor. Do not cross your ankles or legs.  3. Rest your left arm at the level of your heart. You may rest it on a table, desk, or chair.  4. Pull up your shirt sleeve.  5. Wrap the blood pressure cuff around  "the upper part of your left arm. The cuff should be 1 inch (2.5 cm) above your elbow. It is best to wrap the cuff around bare skin.  6. Fit the cuff snugly around your arm. You should be able to place only one finger between the cuff and your arm.  7. Put the cord inside the groove of your elbow.  8. Press the power button.  9. Sit quietly while the cuff fills with air and loses air.  10. Write down the numbers on the screen.  11. Wait 2-3 minutes and then repeat steps 1-10.  What do the numbers mean?  Two numbers make up your blood pressure. The first number is called systolic pressure. The second is called diastolic pressure. An example of a blood pressure reading is \"120 over 80\" (or 120/80).  If you are an adult and do not have a medical condition, use this guide to find out if your blood pressure is normal:  Normal  · First number: below 120.  · Second number: below 80.  Elevated  · First number: 120-129.  · Second number: below 80.  Hypertension stage 1  · First number: 130-139.  · Second number: 80-89.  Hypertension stage 2  · First number: 140 or above.  · Second number: 90 or above.  Your blood pressure is above normal even if only the top or bottom number is above normal.  Follow these instructions at home:  · Check your blood pressure as often as your doctor tells you to.  · Take your monitor to your next doctor's appointment. Your doctor will:  ? Make sure you are using it correctly.  ? Make sure it is working right.  · Make sure you understand what your blood pressure numbers should be.  · Tell your doctor if your medicines are causing side effects.  Contact a doctor if:  · Your blood pressure keeps being high.  Get help right away if:  · Your first blood pressure number is higher than 180.  · Your second blood pressure number is higher than 120.  This information is not intended to replace advice given to you by your health care provider. Make sure you discuss any questions you have with your health " care provider.  Document Released: 11/30/2009 Document Revised: 11/30/2018 Document Reviewed: 05/26/2017  Elsevier Patient Education © 2020 Elsevier Inc.

## 2022-01-20 ASSESSMENT — ENCOUNTER SYMPTOMS
RESPIRATORY NEGATIVE: 1
MUSCULOSKELETAL NEGATIVE: 1
EYES NEGATIVE: 1
HEMATOLOGIC/LYMPHATIC NEGATIVE: 1
CONSTITUTIONAL NEGATIVE: 1
CARDIOVASCULAR NEGATIVE: 1
GASTROINTESTINAL NEGATIVE: 1

## 2022-01-20 NOTE — PROGRESS NOTES
CC:   Chief Complaint   Patient presents with   • Annual Exam     pt states that he has been having night sweats for a few months 1-2 times a week and also hit his head yesterday and would like it looked at     Subjective   Jorje Lockwood is a 26 y.o. male presenting for physical   ?  #Head injury: stood up and hit his head on a piece of metal     #Night sweats: Sleeps under a down blanket, has been taking off the comforter and only under sheets, keeps temperature low at home yet still wakes up covered in sweat-- No additional symptoms    #HTN:   Has been started on amlodipine at some point in 2021 but taken off of it due to well-controlled blood pressures off the medication  Denies any chest pain, shortness of breath, leg swelling  Intermittently normal when checked    #Migraine Headaches: have improved, not having as frequently      #History of enteritis: Was seen in the emergency room In September 2021, transaminitis there    Diet: better  Exercise: intermittent   Dental: needs to see dentist  Eye exam: yearly     Work: quit school, - loves job  Relationship:  last year [employer] 3 Fabián brothers got  last year     Patient History   Past Medical History:   Diagnosis Date   • Migraines        History reviewed. No pertinent surgical history.  Family History   Problem Relation Age of Onset   • No Known Problems Biological Mother    • No Known Problems Biological Father    • No Known Problems Biological Sister      Allergies   Allergen Reactions   • Hydrocodone      Jaw clenches, acting hyper   • Latex Rash     Social History     Socioeconomic History   • Marital status: Single     Spouse name: None   • Number of children: None   • Years of education: None   • Highest education level: None   Occupational History   • None   Tobacco Use   • Smoking status: Smoker, Current Status Unknown   • Smokeless tobacco: Never Used   • Tobacco comment: Cigars only, summer time   Substance and  "Sexual Activity   • Alcohol use: Yes     Comment: occ   • Drug use: Never   • Sexual activity: Defer   Other Topics Concern   • None   Social History Narrative   • None     Social Determinants of Health     Financial Resource Strain: Not on file   Food Insecurity: Not on file   Transportation Needs: Not on file   Physical Activity: Not on file   Stress: Not on file   Social Connections: Not on file   Intimate Partner Violence: Not on file   Housing Stability: Not on file       Current Outpatient Medications:   •  multivitamin tablet, Take by mouth daily., Disp: , Rfl:        Review of Systems   Review of Systems   Constitutional: Negative.    HENT: Negative.    Eyes: Negative.    Respiratory: Negative.    Cardiovascular: Negative.    Gastrointestinal: Negative.    Endocrine: Positive for heat intolerance.   Genitourinary: Negative.    Musculoskeletal: Negative.    Skin: Positive for wound.   Neurological: Positive for headaches.   Hematological: Negative.    Psychiatric/Behavioral: Negative.  The patient is not nervous/anxious.          Objective   Physical Exam:  Vitals:    01/21/22 0806   BP: 130/76   BP Location: Left upper arm   Patient Position: Sitting   Pulse: 80   Resp: 16   Temp: 36.7 °C (98 °F)   TempSrc: Temporal   SpO2: 97%   Weight: 97.5 kg (215 lb)   Height: 1.778 m (5' 10\")     Physical Exam  Constitutional:       Appearance: He is well-developed.   HENT:      Head: Normocephalic. Abrasion present.     Eyes:      Conjunctiva/sclera: Conjunctivae normal.   Cardiovascular:      Rate and Rhythm: Normal rate and regular rhythm.      Heart sounds: Normal heart sounds.   Pulmonary:      Effort: Pulmonary effort is normal.      Breath sounds: Normal breath sounds.   Chest:   Breasts:      Right: No axillary adenopathy or supraclavicular adenopathy.      Left: No axillary adenopathy or supraclavicular adenopathy.       Abdominal:      General: Bowel sounds are normal.      Palpations: Abdomen is soft. There " is no hepatomegaly or splenomegaly.      Tenderness: There is no abdominal tenderness.   Musculoskeletal:         General: Normal range of motion.      Cervical back: Normal range of motion and neck supple.   Lymphadenopathy:      Head:      Right side of head: No submental, submandibular, preauricular or posterior auricular adenopathy.      Left side of head: No submental, submandibular, preauricular or posterior auricular adenopathy.      Cervical: No cervical adenopathy.      Upper Body:      Right upper body: No supraclavicular or axillary adenopathy.      Left upper body: No supraclavicular or axillary adenopathy.      Lower Body: No right inguinal adenopathy. No left inguinal adenopathy.   Skin:     General: Skin is warm and dry.   Neurological:      Mental Status: He is alert and oriented to person, place, and time.   Psychiatric:         Mood and Affect: Mood normal.         Behavior: Behavior normal.         Thought Content: Thought content normal.         Judgment: Judgment normal.       ?  Assessment/Plan   Jorje Lockwood is a 26 y.o. male presenting for   Chief Complaint   Patient presents with   • Annual Exam     pt states that he has been having night sweats for a few months 1-2 times a week and also hit his head yesterday and would like it looked at      Diagnosis Plan   1. Encounter for general adult medical examination without abnormal findings  CBC and Differential    Comprehensive metabolic panel    CBC and Differential    Comprehensive metabolic panel    Overall doing well, discussed health maintenance and chronic issues as below   2. Elevated BP without diagnosis of hypertension  Comprehensive metabolic panel    Comprehensive metabolic panel    Usually high at the office, does not need med at this time, continue to monitor   3. Migraine without aura and without status migrainosus, not intractable      Infrequent and well controlled with over-the-counter medication   4. Scalp injury, initial  encounter      Does not appear infected, mild abrasion, tetanus as below, continue to monitor   5. Transaminitis  Comprehensive metabolic panel    Comprehensive metabolic panel    Recheck levels and Treat accordingly   6. Unexplained night sweats  TSH w reflex FT4    TSH w reflex FT4    Unclear as to cause, may be simple his sheets/blankets but will get CBC and TSH, no lymphadenopathy on exam, No splenomegaly   7. Screening cholesterol level  Lipid panel    Lipid panel   8. Need for hepatitis C screening test  HEPATITIS C AB W/RFX TO HCV RNA,PCR (OUTPATIENT ONLY)    HEPATITIS C AB W/RFX TO HCV RNA,PCR (OUTPATIENT ONLY)   9. Need for Tdap vaccination  Tdap vaccine greater than or equal to 6yo IM       #Health Maintenance: UTD  - Immunizations: Tdap today    Return Pending blood work and symptoms but likely 1 year.

## 2022-01-20 NOTE — PATIENT INSTRUCTIONS
Patient Education     Preventive Care 21-39 Years Old, Male  Preventive care refers to lifestyle choices and visits with your health care provider that can promote health and wellness. This includes:  · A yearly physical exam. This is also called an annual wellness visit.  · Regular dental and eye exams.  · Immunizations.  · Screening for certain conditions.  · Healthy lifestyle choices, such as:  ? Eating a healthy diet.  ? Getting regular exercise.  ? Not using drugs or products that contain nicotine and tobacco.  ? Limiting alcohol use.  What can I expect for my preventive care visit?  Physical exam  Your health care provider may check your:  · Height and weight. These may be used to calculate your BMI (body mass index). BMI is a measurement that tells if you are at a healthy weight.  · Heart rate and blood pressure.  · Body temperature.  · Skin for abnormal spots.  Counseling  Your health care provider may ask you questions about your:  · Past medical problems.  · Family's medical history.  · Alcohol, tobacco, and drug use.  · Emotional well-being.  · Home life and relationship well-being.  · Sexual activity.  · Diet, exercise, and sleep habits.  · Work and work environment.  · Access to firearms.  What immunizations do I need?    Vaccines are usually given at various ages, according to a schedule. Your health care provider will recommend vaccines for you based on your age, medical history, and lifestyle or other factors, such as travel or where you work.  What tests do I need?  Blood tests  · Lipid and cholesterol levels. These may be checked every 5 years starting at age 20.  · Hepatitis C test.  · Hepatitis B test.  Screening    · Diabetes screening. This is done by checking your blood sugar (glucose) after you have not eaten for a while (fasting).  · Genital exam to check for testicular cancer or hernias.  · STD (sexually transmitted disease) testing, if you are at risk.  Talk with your health care  provider about your test results, treatment options, and if necessary, the need for more tests.  Follow these instructions at home:  Eating and drinking    · Eat a healthy diet that includes fresh fruits and vegetables, whole grains, lean protein, and low-fat dairy products.  · Drink enough fluid to keep your urine pale yellow.  · Take vitamin and mineral supplements as recommended by your health care provider.  · Do not drink alcohol if your health care provider tells you not to drink.  · If you drink alcohol:  ? Limit how much you have to 0-2 drinks a day.  ? Be aware of how much alcohol is in your drink. In the [place] one drink equals one 12 oz bottle of beer (355 mL), one 5 oz glass of wine (148 mL), or one 1½ oz glass of hard liquor (44 mL).  Lifestyle  · Take daily care of your teeth and gums. Brush your teeth every morning and night with fluoride toothpaste. Floss one time each day.  · Stay active. Exercise for at least 30 minutes 5 or more days each week.  · Do not use any products that contain nicotine or tobacco, such as cigarettes, e-cigarettes, and chewing tobacco. If you need help quitting, ask your health care provider.  · Do not use drugs.  · If you are sexually active, practice safe sex. Use a condom or other form of protection to prevent STIs (sexually transmitted infections).  · Find healthy ways to cope with stress, such as:  ? Meditation, yoga, or listening to music.  ? Journaling.  ? Talking to a trusted person.  ? Spending time with friends and family.  Safety  · Always wear your seat belt while driving or riding in a vehicle.  · Do not drive:  ? If you have been drinking alcohol. Do not ride with someone who has been drinking.  ? When you are tired or distracted.  ? While texting.  · Wear a helmet and other protective equipment during sports activities.  · If you have firearms in your house, make sure you follow all gun safety procedures.  · Seek help if you have been physically or sexually  abused.  What's next?  · Go to your health care provider once a year for an annual wellness visit.  · Ask your health care provider how often you should have your eyes and teeth checked.  · Stay up to date on all vaccines.  This information is not intended to replace advice given to you by your health care provider. Make sure you discuss any questions you have with your health care provider.  Document Revised: 09/02/2020 Document Reviewed: 12/12/2019  Elsevier Patient Education © 2021 [employer]

## 2022-01-21 ENCOUNTER — OFFICE VISIT (OUTPATIENT)
Dept: FAMILY MEDICINE | Facility: CLINIC | Age: 27
End: 2022-01-21
Payer: COMMERCIAL

## 2022-01-21 VITALS
HEIGHT: 70 IN | RESPIRATION RATE: 16 BRPM | OXYGEN SATURATION: 97 % | HEART RATE: 80 BPM | DIASTOLIC BLOOD PRESSURE: 76 MMHG | SYSTOLIC BLOOD PRESSURE: 130 MMHG | TEMPERATURE: 98 F | BODY MASS INDEX: 30.78 KG/M2 | WEIGHT: 215 LBS

## 2022-01-21 DIAGNOSIS — Z13.220 SCREENING CHOLESTEROL LEVEL: ICD-10-CM

## 2022-01-21 DIAGNOSIS — G43.009 MIGRAINE WITHOUT AURA AND WITHOUT STATUS MIGRAINOSUS, NOT INTRACTABLE: ICD-10-CM

## 2022-01-21 DIAGNOSIS — Z00.00 ENCOUNTER FOR GENERAL ADULT MEDICAL EXAMINATION WITHOUT ABNORMAL FINDINGS: Primary | ICD-10-CM

## 2022-01-21 DIAGNOSIS — R61 UNEXPLAINED NIGHT SWEATS: ICD-10-CM

## 2022-01-21 DIAGNOSIS — S09.90XA SCALP INJURY, INITIAL ENCOUNTER: ICD-10-CM

## 2022-01-21 DIAGNOSIS — R03.0 ELEVATED BP WITHOUT DIAGNOSIS OF HYPERTENSION: ICD-10-CM

## 2022-01-21 DIAGNOSIS — Z11.59 NEED FOR HEPATITIS C SCREENING TEST: ICD-10-CM

## 2022-01-21 DIAGNOSIS — R74.01 TRANSAMINITIS: ICD-10-CM

## 2022-01-21 DIAGNOSIS — Z23 NEED FOR TDAP VACCINATION: ICD-10-CM

## 2022-01-21 PROCEDURE — 99395 PREV VISIT EST AGE 18-39: CPT | Mod: 25 | Performed by: FAMILY MEDICINE

## 2022-01-21 PROCEDURE — 90471 IMMUNIZATION ADMIN: CPT | Performed by: FAMILY MEDICINE

## 2022-01-21 PROCEDURE — 90715 TDAP VACCINE 7 YRS/> IM: CPT | Performed by: FAMILY MEDICINE

## 2022-01-21 PROCEDURE — 3008F BODY MASS INDEX DOCD: CPT | Performed by: FAMILY MEDICINE

## 2022-01-21 ASSESSMENT — ENCOUNTER SYMPTOMS
NERVOUS/ANXIOUS: 0
WOUND: 1
PSYCHIATRIC NEGATIVE: 1
HEADACHES: 1

## 2022-01-21 ASSESSMENT — PAIN SCALES - GENERAL: PAINLEVEL: 0-NO PAIN

## 2022-11-01 ENCOUNTER — APPOINTMENT (EMERGENCY)
Dept: RADIOLOGY | Facility: HOSPITAL | Age: 27
End: 2022-11-01
Payer: COMMERCIAL

## 2022-11-01 ENCOUNTER — HOSPITAL ENCOUNTER (EMERGENCY)
Facility: HOSPITAL | Age: 27
Discharge: HOME | End: 2022-11-01
Attending: EMERGENCY MEDICINE | Admitting: EMERGENCY MEDICINE
Payer: COMMERCIAL

## 2022-11-01 VITALS
DIASTOLIC BLOOD PRESSURE: 91 MMHG | HEART RATE: 73 BPM | TEMPERATURE: 98.6 F | SYSTOLIC BLOOD PRESSURE: 155 MMHG | OXYGEN SATURATION: 99 % | RESPIRATION RATE: 16 BRPM

## 2022-11-01 DIAGNOSIS — S09.90XA HEAD INJURY, INITIAL ENCOUNTER: ICD-10-CM
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
## 2022-11-01 DIAGNOSIS — S16.1XXA STRAIN OF NECK MUSCLE, INITIAL ENCOUNTER: ICD-10-CM

## 2022-11-01 DIAGNOSIS — S06.0X0A CONCUSSION WITHOUT LOSS OF CONSCIOUSNESS, INITIAL ENCOUNTER: ICD-10-CM

## 2022-11-01 PROCEDURE — G1004 CDSM NDSC: HCPCS

## 2022-11-01 PROCEDURE — 63700000 HC SELF-ADMINISTRABLE DRUG: Performed by: PHYSICIAN ASSISTANT

## 2022-11-01 PROCEDURE — 99284 EMERGENCY DEPT VISIT MOD MDM: CPT | Mod: 25

## 2022-11-01 RX ORDER — ACETAMINOPHEN 325 MG/1
650 TABLET ORAL ONCE
Status: COMPLETED | OUTPATIENT
Start: 2022-11-01 | End: 2022-11-01

## 2022-11-01 RX ADMIN — ACETAMINOPHEN 650 MG: 325 TABLET ORAL at 18:14

## 2022-11-01 ASSESSMENT — ENCOUNTER SYMPTOMS
PHOTOPHOBIA: 0
SHORTNESS OF BREATH: 0
FACIAL SWELLING: 0
ALTERED MENTAL STATUS: 0
LIGHT-HEADEDNESS: 1
VOMITING: 0
NUMBNESS: 0
NAUSEA: 1
NECK PAIN: 1
ABDOMINAL PAIN: 0
BACK PAIN: 0
HEADACHES: 1
FEVER: 0
FATIGUE: 0
DIZZINESS: 1
CONTUSION: 0
LOSS OF CONSCIOUSNESS: 0

## 2022-11-01 NOTE — DISCHARGE INSTRUCTIONS
Take ibuprofen or Tylenol as needed for pain.  Follow-up with the Workmen's [employer]. doctor for further evaluation.  Return to the emergency department for any worsening symptoms.
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
## 2023-01-26 PROBLEM — R74.01 TRANSAMINITIS: Status: RESOLVED | Noted: 2019-12-19 | Resolved: 2023-01-26

## 2023-01-26 ASSESSMENT — ENCOUNTER SYMPTOMS
CARDIOVASCULAR NEGATIVE: 1
EYES NEGATIVE: 1
HEMATOLOGIC/LYMPHATIC NEGATIVE: 1
RESPIRATORY NEGATIVE: 1
CONSTITUTIONAL NEGATIVE: 1
NERVOUS/ANXIOUS: 0
PSYCHIATRIC NEGATIVE: 1
GASTROINTESTINAL NEGATIVE: 1
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
## 2023-01-27 ENCOUNTER — OFFICE VISIT (OUTPATIENT)
Dept: FAMILY MEDICINE | Facility: CLINIC | Age: 28
End: 2023-01-27
Payer: COMMERCIAL

## 2023-01-27 ENCOUNTER — HOSPITAL ENCOUNTER (OUTPATIENT)
Dept: RADIOLOGY | Age: 28
Discharge: HOME | End: 2023-01-27
Attending: FAMILY MEDICINE
Payer: COMMERCIAL

## 2023-01-27 ENCOUNTER — TELEPHONE (OUTPATIENT)
Dept: FAMILY MEDICINE | Facility: CLINIC | Age: 28
End: 2023-01-27

## 2023-01-27 VITALS
BODY MASS INDEX: 30.24 KG/M2 | OXYGEN SATURATION: 98 % | HEART RATE: 76 BPM | RESPIRATION RATE: 16 BRPM | SYSTOLIC BLOOD PRESSURE: 128 MMHG | WEIGHT: 211.2 LBS | HEIGHT: 70 IN | TEMPERATURE: 97.6 F | DIASTOLIC BLOOD PRESSURE: 80 MMHG

## 2023-01-27 DIAGNOSIS — G43.009 MIGRAINE WITHOUT AURA AND WITHOUT STATUS MIGRAINOSUS, NOT INTRACTABLE: ICD-10-CM

## 2023-01-27 DIAGNOSIS — M54.9 CHRONIC BACK PAIN, UNSPECIFIED BACK LOCATION, UNSPECIFIED BACK PAIN LATERALITY: ICD-10-CM

## 2023-01-27 DIAGNOSIS — R74.01 TRANSAMINITIS: ICD-10-CM

## 2023-01-27 DIAGNOSIS — Z11.59 NEED FOR HEPATITIS C SCREENING TEST: ICD-10-CM

## 2023-01-27 DIAGNOSIS — G89.29 CHRONIC BACK PAIN, UNSPECIFIED BACK LOCATION, UNSPECIFIED BACK PAIN LATERALITY: ICD-10-CM

## 2023-01-27 DIAGNOSIS — R03.0 ELEVATED BP WITHOUT DIAGNOSIS OF HYPERTENSION: ICD-10-CM

## 2023-01-27 DIAGNOSIS — R61 UNEXPLAINED NIGHT SWEATS: ICD-10-CM

## 2023-01-27 DIAGNOSIS — Z13.220 SCREENING CHOLESTEROL LEVEL: ICD-10-CM

## 2023-01-27 DIAGNOSIS — Z23 NEED FOR INFLUENZA VACCINATION: ICD-10-CM

## 2023-01-27 DIAGNOSIS — Z00.00 ENCOUNTER FOR GENERAL ADULT MEDICAL EXAMINATION WITHOUT ABNORMAL FINDINGS: Primary | ICD-10-CM

## 2023-01-27 PROCEDURE — 72110 X-RAY EXAM L-2 SPINE 4/>VWS: CPT

## 2023-01-27 PROCEDURE — 99395 PREV VISIT EST AGE 18-39: CPT | Mod: 25 | Performed by: FAMILY MEDICINE

## 2023-01-27 PROCEDURE — 3008F BODY MASS INDEX DOCD: CPT | Performed by: FAMILY MEDICINE

## 2023-01-27 PROCEDURE — 90686 IIV4 VACC NO PRSV 0.5 ML IM: CPT | Performed by: FAMILY MEDICINE

## 2023-01-27 PROCEDURE — 72074 X-RAY EXAM THORAC SPINE4/>VW: CPT

## 2023-01-27 PROCEDURE — 90471 IMMUNIZATION ADMIN: CPT | Performed by: FAMILY MEDICINE

## 2023-01-27 RX ORDER — CYCLOBENZAPRINE HCL 5 MG
5 TABLET ORAL NIGHTLY PRN
Qty: 20 TABLET | Refills: 0 | Status: SHIPPED | OUTPATIENT
Start: 2023-01-27 | End: 2024-01-29

## 2023-01-27 RX ORDER — ACETAMINOPHEN 500 MG
5 TABLET ORAL NIGHTLY
COMMUNITY
End: 2024-01-29

## 2023-01-27 ASSESSMENT — ENCOUNTER SYMPTOMS
ARTHRALGIAS: 1
MYALGIAS: 1
HEADACHES: 0
BACK PAIN: 1

## 2023-01-27 ASSESSMENT — PATIENT HEALTH QUESTIONNAIRE - PHQ9: SUM OF ALL RESPONSES TO PHQ9 QUESTIONS 1 & 2: 0

## 2023-01-27 NOTE — TELEPHONE ENCOUNTER
----- Message from Tanvi Piper MD sent at 1/27/2023  2:12 PM EST -----  Please let patient know that his x-ray lumbar and thoracic spine were both normal

## 2024-01-26 ASSESSMENT — ENCOUNTER SYMPTOMS
CARDIOVASCULAR NEGATIVE: 1
HEMATOLOGIC/LYMPHATIC NEGATIVE: 1
PSYCHIATRIC NEGATIVE: 1
NERVOUS/ANXIOUS: 0
GASTROINTESTINAL NEGATIVE: 1
CONSTITUTIONAL NEGATIVE: 1
RESPIRATORY NEGATIVE: 1
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
## 2024-01-29 ENCOUNTER — OFFICE VISIT (OUTPATIENT)
Dept: FAMILY MEDICINE | Facility: CLINIC | Age: 29
End: 2024-01-29
Payer: COMMERCIAL

## 2024-01-29 VITALS
OXYGEN SATURATION: 97 % | TEMPERATURE: 97.6 F | RESPIRATION RATE: 18 BRPM | DIASTOLIC BLOOD PRESSURE: 82 MMHG | HEART RATE: 63 BPM | BODY MASS INDEX: 32.21 KG/M2 | SYSTOLIC BLOOD PRESSURE: 124 MMHG | HEIGHT: 70 IN | WEIGHT: 225 LBS

## 2024-01-29 DIAGNOSIS — G43.009 MIGRAINE WITHOUT AURA AND WITHOUT STATUS MIGRAINOSUS, NOT INTRACTABLE: ICD-10-CM

## 2024-01-29 DIAGNOSIS — R74.01 ELEVATED ALT MEASUREMENT: ICD-10-CM

## 2024-01-29 DIAGNOSIS — Z23 NEED FOR VACCINATION: ICD-10-CM

## 2024-01-29 DIAGNOSIS — Z00.00 ENCOUNTER FOR GENERAL ADULT MEDICAL EXAMINATION WITHOUT ABNORMAL FINDINGS: Primary | ICD-10-CM

## 2024-01-29 DIAGNOSIS — J30.89 ALLERGIC RHINITIS DUE TO HOUSE DUST MITE: ICD-10-CM

## 2024-01-29 DIAGNOSIS — Z13.220 SCREENING CHOLESTEROL LEVEL: ICD-10-CM

## 2024-01-29 DIAGNOSIS — R74.01 TRANSAMINITIS: ICD-10-CM

## 2024-01-29 DIAGNOSIS — Z11.59 NEED FOR HEPATITIS C SCREENING TEST: ICD-10-CM

## 2024-01-29 DIAGNOSIS — R03.0 ELEVATED BP WITHOUT DIAGNOSIS OF HYPERTENSION: ICD-10-CM

## 2024-01-29 DIAGNOSIS — Z11.4 SCREENING FOR HIV (HUMAN IMMUNODEFICIENCY VIRUS): ICD-10-CM

## 2024-01-29 PROCEDURE — 90471 IMMUNIZATION ADMIN: CPT | Performed by: FAMILY MEDICINE

## 2024-01-29 PROCEDURE — 99395 PREV VISIT EST AGE 18-39: CPT | Mod: 25 | Performed by: FAMILY MEDICINE

## 2024-01-29 PROCEDURE — 90686 IIV4 VACC NO PRSV 0.5 ML IM: CPT | Performed by: FAMILY MEDICINE

## 2024-01-29 PROCEDURE — 3008F BODY MASS INDEX DOCD: CPT | Performed by: FAMILY MEDICINE

## 2024-01-29 RX ORDER — DIPHENHYDRAMINE HCL 25 MG
TABLET ORAL
COMMUNITY

## 2024-01-29 RX ORDER — LEVOCETIRIZINE DIHYDROCHLORIDE 5 MG/1
TABLET, FILM COATED ORAL
COMMUNITY

## 2024-01-29 RX ORDER — ACETAMINOPHEN, ASPIRIN (NSAID), AND CAFFEINE 250; 250; 65 MG/1; MG/1; MG/1
TABLET, FILM COATED ORAL
COMMUNITY

## 2024-01-29 ASSESSMENT — PATIENT HEALTH QUESTIONNAIRE - PHQ9
SUM OF ALL RESPONSES TO PHQ9 QUESTIONS 1 & 2: 2
SUM OF ALL RESPONSES TO PHQ QUESTIONS 1-9: 7

## 2024-01-29 ASSESSMENT — ENCOUNTER SYMPTOMS
MUSCULOSKELETAL NEGATIVE: 1
HEADACHES: 1
EYE ITCHING: 1
ENDOCRINE NEGATIVE: 1
RHINORRHEA: 1

## 2024-02-16 LAB
ALBUMIN SERPL-MCNC: 4.5 G/DL (ref 3.6–5.1)
ALBUMIN/GLOB SERPL: 2 (CALC) (ref 1–2.5)
ALP SERPL-CCNC: 86 U/L (ref 36–130)
ALT SERPL-CCNC: 56 U/L (ref 9–46)
AST SERPL-CCNC: 24 U/L (ref 10–40)
BASOPHILS # BLD AUTO: 38 CELLS/UL (ref 0–200)
BASOPHILS NFR BLD AUTO: 0.6 %
BILIRUB SERPL-MCNC: 0.6 MG/DL (ref 0.2–1.2)
BUN SERPL-MCNC: 14 MG/DL (ref 7–25)
BUN/CREAT SERPL: ABNORMAL (CALC) (ref 6–22)
CALCIUM SERPL-MCNC: 9.5 MG/DL (ref 8.6–10.3)
CHLORIDE SERPL-SCNC: 104 MMOL/L (ref 98–110)
CHOLEST SERPL-MCNC: 179 MG/DL
CHOLEST/HDLC SERPL: 4 (CALC)
CO2 SERPL-SCNC: 26 MMOL/L (ref 20–32)
CREAT SERPL-MCNC: 1.02 MG/DL (ref 0.6–1.26)
EGFRCR SERPLBLD CKD-EPI 2021: 101 ML/MIN/1.73M2
EOSINOPHIL # BLD AUTO: 269 CELLS/UL (ref 15–500)
EOSINOPHIL NFR BLD AUTO: 4.2 %
ERYTHROCYTE [DISTWIDTH] IN BLOOD BY AUTOMATED COUNT: 12.7 % (ref 11–15)
GLOBULIN SER CALC-MCNC: 2.3 G/DL (CALC) (ref 1.9–3.7)
GLUCOSE SERPL-MCNC: 92 MG/DL (ref 65–99)
HCT VFR BLD AUTO: 46.8 % (ref 38.5–50)
HCV AB SERPL QL IA: NORMAL
HDLC SERPL-MCNC: 45 MG/DL
HGB BLD-MCNC: 16.1 G/DL (ref 13.2–17.1)
HIV 1+2 AB+HIV1 P24 AG SERPL QL IA: NORMAL
LDLC SERPL CALC-MCNC: 109 MG/DL (CALC)
LYMPHOCYTES # BLD AUTO: 2464 CELLS/UL (ref 850–3900)
LYMPHOCYTES NFR BLD AUTO: 38.5 %
MCH RBC QN AUTO: 29.5 PG (ref 27–33)
MCHC RBC AUTO-ENTMCNC: 34.4 G/DL (ref 32–36)
MCV RBC AUTO: 85.9 FL (ref 80–100)
MONOCYTES # BLD AUTO: 563 CELLS/UL (ref 200–950)
MONOCYTES NFR BLD AUTO: 8.8 %
NEUTROPHILS # BLD AUTO: 3066 CELLS/UL (ref 1500–7800)
NEUTROPHILS NFR BLD AUTO: 47.9 %
NONHDLC SERPL-MCNC: 134 MG/DL (CALC)
PLATELET # BLD AUTO: 276 THOUSAND/UL (ref 140–400)
PMV BLD REES-ECKER: 12.1 FL (ref 7.5–12.5)
POTASSIUM SERPL-SCNC: 4.6 MMOL/L (ref 3.5–5.3)
PROT SERPL-MCNC: 6.8 G/DL (ref 6.1–8.1)
RBC # BLD AUTO: 5.45 MILLION/UL (ref 4.2–5.8)
SODIUM SERPL-SCNC: 139 MMOL/L (ref 135–146)
TRIGL SERPL-MCNC: 132 MG/DL
TSH SERPL-ACNC: 1.27 MIU/L (ref 0.4–4.5)
WBC # BLD AUTO: 6.4 THOUSAND/UL (ref 3.8–10.8)

## 2024-02-19 DIAGNOSIS — R74.01 TRANSAMINITIS: Primary | ICD-10-CM
